# Patient Record
Sex: FEMALE | Race: WHITE | NOT HISPANIC OR LATINO | URBAN - METROPOLITAN AREA
[De-identification: names, ages, dates, MRNs, and addresses within clinical notes are randomized per-mention and may not be internally consistent; named-entity substitution may affect disease eponyms.]

---

## 2020-11-02 ENCOUNTER — OFFICE VISIT (OUTPATIENT)
Dept: URBAN - METROPOLITAN AREA CLINIC 78 | Facility: CLINIC | Age: 50
End: 2020-11-02

## 2020-11-02 NOTE — HPI-TODAY'S VISIT:
The patient has never had a colonoscopy previously. There is no FH of colon cancer or colon polyps.  There is no recent history of rectal bleeding. The patient has no pertinent additional complaints of abdominal pain, constipation, diarrhea, bloating, rectal pain, anorexia or unintentional weight loss.   Regarding any upper GI complaints, the patient has not had heartburn, nausea, vomiting or dysphagia.   The patient does not take blood thinners.  They deny any CP or GREEN.

## 2020-11-23 ENCOUNTER — OFFICE VISIT (OUTPATIENT)
Dept: URBAN - METROPOLITAN AREA CLINIC 78 | Facility: CLINIC | Age: 50
End: 2020-11-23

## 2020-12-17 ENCOUNTER — DASHBOARD ENCOUNTERS (OUTPATIENT)
Age: 50
End: 2020-12-17

## 2020-12-17 ENCOUNTER — OFFICE VISIT (OUTPATIENT)
Dept: URBAN - METROPOLITAN AREA CLINIC 78 | Facility: CLINIC | Age: 50
End: 2020-12-17
Payer: COMMERCIAL

## 2020-12-17 DIAGNOSIS — Z12.11 COLON CANCER SCREENING: ICD-10-CM

## 2020-12-17 DIAGNOSIS — K44.9 HIATAL HERNIA - S/P SURGERY: ICD-10-CM

## 2020-12-17 DIAGNOSIS — Z98.84 GASTRIC BYPASS STATUS FOR OBESITY: ICD-10-CM

## 2020-12-17 PROCEDURE — 99243 OFF/OP CNSLTJ NEW/EST LOW 30: CPT | Performed by: INTERNAL MEDICINE

## 2020-12-17 PROCEDURE — G8482 FLU IMMUNIZE ORDER/ADMIN: HCPCS | Performed by: INTERNAL MEDICINE

## 2020-12-17 RX ORDER — SODIUM, POTASSIUM,MAG SULFATES 17.5-3.13G
354 ML SOLUTION, RECONSTITUTED, ORAL ORAL
Qty: 1 | Refills: 0 | OUTPATIENT
Start: 2020-12-17

## 2020-12-17 NOTE — HPI-OTHER HISTORIES
The patient has never had a colonoscopy previously. There is no FH of colon cancer; she is unsure whether her father has had colon polyps. Her mother was diagnosed with pancreatic cancer in her 70's, but had a successful Whipple's.  There is no recent history of rectal bleeding. The patient has no pertinent additional complaints of abdominal pain, constipation, diarrhea, bloating, rectal pain, anorexia or unintentional weight loss.  	 Regarding any upper GI complaints, the patient has not had heartburn, nausea, vomiting or dysphagia. She underwent a sleeve gastrectomy in 2018. She has noticed that when she eats sugary foods it triggers loose stools.   The patient does not take blood thinners.  They deny any CP or GREEN.  She follows with Dr. Heather Allison for hypothyroidism.

## 2021-02-24 ENCOUNTER — OFFICE VISIT (OUTPATIENT)
Dept: URBAN - METROPOLITAN AREA SURGERY CENTER 15 | Facility: SURGERY CENTER | Age: 51
End: 2021-02-24

## 2021-05-19 ENCOUNTER — OFFICE VISIT (OUTPATIENT)
Dept: URBAN - METROPOLITAN AREA SURGERY CENTER 15 | Facility: SURGERY CENTER | Age: 51
End: 2021-05-19
Payer: COMMERCIAL

## 2021-05-19 ENCOUNTER — CLAIMS CREATED FROM THE CLAIM WINDOW (OUTPATIENT)
Dept: URBAN - METROPOLITAN AREA CLINIC 4 | Facility: CLINIC | Age: 51
End: 2021-05-19
Payer: COMMERCIAL

## 2021-05-19 DIAGNOSIS — Z83.71 FAMILY HISTORY OF COLON POLYPS: ICD-10-CM

## 2021-05-19 DIAGNOSIS — D12.5 BENIGN NEOPLASM OF SIGMOID COLON: ICD-10-CM

## 2021-05-19 DIAGNOSIS — D12.5 ADENOMA OF SIGMOID COLON: ICD-10-CM

## 2021-05-19 PROCEDURE — 88305 TISSUE EXAM BY PATHOLOGIST: CPT | Performed by: PATHOLOGY

## 2021-05-19 PROCEDURE — G8907 PT DOC NO EVENTS ON DISCHARG: HCPCS | Performed by: INTERNAL MEDICINE

## 2021-05-19 PROCEDURE — 45385 COLONOSCOPY W/LESION REMOVAL: CPT | Performed by: INTERNAL MEDICINE
